# Patient Record
Sex: MALE | Race: WHITE | NOT HISPANIC OR LATINO | Employment: FULL TIME | RURAL
[De-identification: names, ages, dates, MRNs, and addresses within clinical notes are randomized per-mention and may not be internally consistent; named-entity substitution may affect disease eponyms.]

---

## 2022-07-13 ENCOUNTER — OFFICE VISIT (OUTPATIENT)
Dept: OTOLARYNGOLOGY | Facility: CLINIC | Age: 37
End: 2022-07-13
Payer: COMMERCIAL

## 2022-07-13 VITALS — BODY MASS INDEX: 26.95 KG/M2 | HEIGHT: 74 IN | WEIGHT: 210 LBS

## 2022-07-13 DIAGNOSIS — H65.23 CHRONIC SEROUS OTITIS MEDIA OF BOTH EARS: Primary | ICD-10-CM

## 2022-07-13 DIAGNOSIS — H90.2 CONDUCTIVE HEARING LOSS, UNSPECIFIED LATERALITY: ICD-10-CM

## 2022-07-13 DIAGNOSIS — H65.22 LEFT CHRONIC SEROUS OTITIS MEDIA: ICD-10-CM

## 2022-07-13 PROCEDURE — 1160F RVW MEDS BY RX/DR IN RCRD: CPT | Mod: ,,, | Performed by: OTOLARYNGOLOGY

## 2022-07-13 PROCEDURE — 3008F PR BODY MASS INDEX (BMI) DOCUMENTED: ICD-10-PCS | Mod: ,,, | Performed by: OTOLARYNGOLOGY

## 2022-07-13 PROCEDURE — 99204 OFFICE O/P NEW MOD 45 MIN: CPT | Mod: S$PBB,,, | Performed by: OTOLARYNGOLOGY

## 2022-07-13 PROCEDURE — 1159F MED LIST DOCD IN RCRD: CPT | Mod: ,,, | Performed by: OTOLARYNGOLOGY

## 2022-07-13 PROCEDURE — 99203 OFFICE O/P NEW LOW 30 MIN: CPT | Mod: PBBFAC | Performed by: OTOLARYNGOLOGY

## 2022-07-13 PROCEDURE — 3008F BODY MASS INDEX DOCD: CPT | Mod: ,,, | Performed by: OTOLARYNGOLOGY

## 2022-07-13 PROCEDURE — 99204 PR OFFICE/OUTPT VISIT, NEW, LEVL IV, 45-59 MIN: ICD-10-PCS | Mod: S$PBB,,, | Performed by: OTOLARYNGOLOGY

## 2022-07-13 PROCEDURE — 1160F PR REVIEW ALL MEDS BY PRESCRIBER/CLIN PHARMACIST DOCUMENTED: ICD-10-PCS | Mod: ,,, | Performed by: OTOLARYNGOLOGY

## 2022-07-13 PROCEDURE — 1159F PR MEDICATION LIST DOCUMENTED IN MEDICAL RECORD: ICD-10-PCS | Mod: ,,, | Performed by: OTOLARYNGOLOGY

## 2022-07-13 NOTE — PROGRESS NOTES
Subjective:       Patient ID: Miky Lopez is a 37 y.o. male.    Chief Complaint: Follow-up (Pt states he has a left PE tube, not sure how long PE tube has been in ear. States right ear has no hearing. )  irght ear has chronic surgery   HPI  Review of Systems   HENT: Positive for ear pain and hearing loss.    All other systems reviewed and are negative.      Objective:      Physical Exam  General: NAD  Head: Normocephalic, atraumatic, no facial asymmetry/normal strength,  Ears: Both auricules normal in appearance, w/o deformities tympanic membranes right scarred left tube in canal non functioning  external auditory canals normal  Nose: External nose w/o deformities normal turbinates no drainage or inflammation  Oral Cavity: Lips, gums, floor of mouth, tongue hard palate, and buccal mucosa without mass/lesion  Oropharynx: Mucosa pink and moist, soft palate, posterior pharynx and oropharyngeal wall without mass/lesion  Neck: Supple, symmetric, trachea midline, no palpable mass/lesion, no palpable cervical lymphadenopathy  Skin: Warm and dry, no concerning lesions  Respiratory: Respirations even, unlabored    Assessment:       1. Chronic serous otitis media of both ears    2. Left chronic serous otitis media    3. Conductive hearing loss, unspecified laterality        Plan:       T-tube in OR on left

## 2022-07-29 ENCOUNTER — ANESTHESIA EVENT (OUTPATIENT)
Dept: SURGERY | Facility: HOSPITAL | Age: 37
End: 2022-07-29
Payer: COMMERCIAL

## 2022-07-29 ENCOUNTER — ANESTHESIA (OUTPATIENT)
Dept: SURGERY | Facility: HOSPITAL | Age: 37
End: 2022-07-29
Payer: COMMERCIAL

## 2022-07-29 ENCOUNTER — HOSPITAL ENCOUNTER (OUTPATIENT)
Facility: HOSPITAL | Age: 37
Discharge: HOME | End: 2022-07-29
Attending: OTOLARYNGOLOGY | Admitting: OTOLARYNGOLOGY
Payer: COMMERCIAL

## 2022-07-29 VITALS
HEIGHT: 71 IN | DIASTOLIC BLOOD PRESSURE: 96 MMHG | RESPIRATION RATE: 16 BRPM | HEART RATE: 77 BPM | SYSTOLIC BLOOD PRESSURE: 140 MMHG | OXYGEN SATURATION: 99 % | BODY MASS INDEX: 29.12 KG/M2 | WEIGHT: 208 LBS | TEMPERATURE: 98 F

## 2022-07-29 DIAGNOSIS — H66.92 CHRONIC OTITIS MEDIA OF LEFT EAR: Primary | ICD-10-CM

## 2022-07-29 PROCEDURE — D9220A PRA ANESTHESIA: ICD-10-PCS | Mod: CRNA,,, | Performed by: NURSE ANESTHETIST, CERTIFIED REGISTERED

## 2022-07-29 PROCEDURE — 69436 PR CREATE EARDRUM OPENING,GEN ANESTH: ICD-10-PCS | Mod: LT,,, | Performed by: OTOLARYNGOLOGY

## 2022-07-29 PROCEDURE — D9220A PRA ANESTHESIA: Mod: CRNA,,, | Performed by: NURSE ANESTHETIST, CERTIFIED REGISTERED

## 2022-07-29 PROCEDURE — 27201423 OPTIME MED/SURG SUP & DEVICES STERILE SUPPLY: Performed by: OTOLARYNGOLOGY

## 2022-07-29 PROCEDURE — 71000015 HC POSTOP RECOV 1ST HR: Performed by: OTOLARYNGOLOGY

## 2022-07-29 PROCEDURE — 69436 CREATE EARDRUM OPENING: CPT | Mod: LT,,, | Performed by: OTOLARYNGOLOGY

## 2022-07-29 PROCEDURE — D9220A PRA ANESTHESIA: ICD-10-PCS | Mod: ANES,,, | Performed by: ANESTHESIOLOGY

## 2022-07-29 PROCEDURE — 37000008 HC ANESTHESIA 1ST 15 MINUTES: Performed by: OTOLARYNGOLOGY

## 2022-07-29 PROCEDURE — 36000704 HC OR TIME LEV I 1ST 15 MIN: Performed by: OTOLARYNGOLOGY

## 2022-07-29 PROCEDURE — D9220A PRA ANESTHESIA: Mod: ANES,,, | Performed by: ANESTHESIOLOGY

## 2022-07-29 PROCEDURE — 63600175 PHARM REV CODE 636 W HCPCS: Performed by: NURSE ANESTHETIST, CERTIFIED REGISTERED

## 2022-07-29 PROCEDURE — 25000003 PHARM REV CODE 250: Performed by: OTOLARYNGOLOGY

## 2022-07-29 PROCEDURE — 71000033 HC RECOVERY, INTIAL HOUR: Performed by: OTOLARYNGOLOGY

## 2022-07-29 RX ORDER — PROPOFOL 10 MG/ML
VIAL (ML) INTRAVENOUS
Status: DISCONTINUED | OUTPATIENT
Start: 2022-07-29 | End: 2022-07-29

## 2022-07-29 RX ORDER — MORPHINE SULFATE 8 MG/ML
4 INJECTION INTRAMUSCULAR; INTRAVENOUS; SUBCUTANEOUS EVERY 5 MIN PRN
Status: DISCONTINUED | OUTPATIENT
Start: 2022-07-29 | End: 2022-07-29 | Stop reason: HOSPADM

## 2022-07-29 RX ORDER — MEPERIDINE HYDROCHLORIDE 25 MG/ML
25 INJECTION INTRAMUSCULAR; INTRAVENOUS; SUBCUTANEOUS EVERY 10 MIN PRN
Status: DISCONTINUED | OUTPATIENT
Start: 2022-07-29 | End: 2022-07-29 | Stop reason: HOSPADM

## 2022-07-29 RX ORDER — ONDANSETRON 2 MG/ML
4 INJECTION INTRAMUSCULAR; INTRAVENOUS DAILY PRN
Status: DISCONTINUED | OUTPATIENT
Start: 2022-07-29 | End: 2022-07-29 | Stop reason: HOSPADM

## 2022-07-29 RX ORDER — HYDROMORPHONE HYDROCHLORIDE 2 MG/ML
0.5 INJECTION, SOLUTION INTRAMUSCULAR; INTRAVENOUS; SUBCUTANEOUS EVERY 5 MIN PRN
Status: DISCONTINUED | OUTPATIENT
Start: 2022-07-29 | End: 2022-07-29 | Stop reason: HOSPADM

## 2022-07-29 RX ORDER — CIPROFLOXACIN AND DEXAMETHASONE 3; 1 MG/ML; MG/ML
SUSPENSION/ DROPS AURICULAR (OTIC)
Status: DISCONTINUED | OUTPATIENT
Start: 2022-07-29 | End: 2022-07-29 | Stop reason: HOSPADM

## 2022-07-29 RX ORDER — OXYCODONE HYDROCHLORIDE 5 MG/1
5 TABLET ORAL
Status: DISCONTINUED | OUTPATIENT
Start: 2022-07-29 | End: 2022-07-29 | Stop reason: HOSPADM

## 2022-07-29 RX ORDER — DIPHENHYDRAMINE HYDROCHLORIDE 50 MG/ML
25 INJECTION INTRAMUSCULAR; INTRAVENOUS EVERY 6 HOURS PRN
Status: DISCONTINUED | OUTPATIENT
Start: 2022-07-29 | End: 2022-07-29 | Stop reason: HOSPADM

## 2022-07-29 RX ORDER — MIDAZOLAM HYDROCHLORIDE 1 MG/ML
INJECTION INTRAMUSCULAR; INTRAVENOUS
Status: DISCONTINUED | OUTPATIENT
Start: 2022-07-29 | End: 2022-07-29

## 2022-07-29 RX ADMIN — PROPOFOL 200 MG: 10 INJECTION, EMULSION INTRAVENOUS at 08:07

## 2022-07-29 RX ADMIN — MIDAZOLAM 2 MG: 1 INJECTION INTRAMUSCULAR; INTRAVENOUS at 08:07

## 2022-07-29 NOTE — OR NURSING
0822 PT TO PACU ASLEEP. ORAL AIRWAY IN PLACE. O2 AT 5L VIA FACEMASK. IV INFUSING TO L HAND. COTTON BALL TO L EAR C/D/I. NO DRAINAGE NOTED. VSS. SAFETY MEASURES IN PLACE.    0842 PT AWAKE, FREQUENTLY DROWSY. ORAL AIRWAY REMOVED. VSS. DRESSING TO L EAR C/D/I. FACEMASK REMOVED. SAO2 97% ON RA.    0855 OUT OF PACU    0900 PT TRANSFERRED TO [facility] 6. RELEASED TO BALBINA WHYTE RN. PT AWAKE AND ALERT. RESP EVEN AND UNLABORED. IV INFUSING. COTTON BALL TO L EAR C/D/I. VS: /100, HR 66, RR 16, SAO2 96% ON RA. PT STATES HIS BP IS NORMALLY HIGH BUT HE DOES NOT TAKE MEDICATION FOR IT. SAFETY MEASURES IN PLACES.

## 2022-07-29 NOTE — ANESTHESIA POSTPROCEDURE EVALUATION
Anesthesia Post Evaluation    Patient: Miky Lopez    Procedure(s) Performed: Procedure(s) (LRB):  MYRINGOTOMY, WITH TYMPANOSTOMY TUBE INSERTION (LEFT T-TUBE) (Left)    Final Anesthesia Type: general      Patient location during evaluation: PACU  Patient participation: Yes- Able to Participate  Level of consciousness: awake and alert  Post-procedure vital signs: reviewed and stable  Pain management: adequate  Airway patency: patent  EDWIN mitigation strategies: Multimodal analgesia  PONV status at discharge: No PONV  Anesthetic complications: no      Cardiovascular status: blood pressure returned to baseline  Respiratory status: unassisted  Hydration status: euvolemic  Follow-up not needed.          Vitals Value Taken Time   /96 07/29/22 0915   Temp 36.4 °C (97.6 °F) 07/29/22 0825   Pulse 77 07/29/22 0930   Resp 16 07/29/22 0930   SpO2 99 % 07/29/22 0930         Event Time   Out of Recovery 08:55:00         Pain/Justo Score: Justo Score: 9 (7/29/2022  8:51 AM)

## 2022-07-29 NOTE — TRANSFER OF CARE
"Anesthesia Transfer of Care Note    Patient: Miky Lopez    Procedure(s) Performed: Procedure(s) (LRB):  MYRINGOTOMY, WITH TYMPANOSTOMY TUBE INSERTION (LEFT T-TUBE) (Left)    Patient location: PACU    Anesthesia Type: general    Transport from OR: Transported from OR on room air with adequate spontaneous ventilation    Post pain: adequate analgesia    Post assessment: no apparent anesthetic complications    Post vital signs: stable    Level of consciousness: sedated and awake    Nausea/Vomiting: no nausea/vomiting    Complications: none    Transfer of care protocol was followed      Last vitals:   Visit Vitals  BP (!) 135/94   Pulse 75   Temp 36.4 °C (97.6 °F)   Resp 16   Ht 5' 11" (1.803 m)   Wt 94.3 kg (208 lb)   SpO2 98%   BMI 29.01 kg/m²     "

## 2022-07-29 NOTE — ANESTHESIA PREPROCEDURE EVALUATION
07/29/2022  Miky Lopez is a 37 y.o., male.      Pre-op Assessment    I have reviewed the Patient Summary Reports.    I have reviewed the NPO Status.   I have reviewed the Medications.     Review of Systems  Social:  Non-Smoker, No Alcohol Use    Hematology/Oncology:  Hematology Normal   Oncology Normal     EENT/Dental:EENT/Dental Normal   Cardiovascular:  Cardiovascular Normal     Pulmonary:  Pulmonary Normal    Renal/:  Renal/ Normal     Hepatic/GI:  Hepatic/GI Normal    Musculoskeletal:  Musculoskeletal Normal    Neurological:  Neurology Normal    Endocrine:  Endocrine Normal    Dermatological:  Skin Normal    Psych:  Psychiatric Normal           Physical Exam  General: Well nourished, Cooperative, Alert and Oriented    Airway:  Mallampati: II / II  Mouth Opening: Normal  TM Distance: Normal  Neck ROM: Normal ROM    Dental:  Intact    Chest/Lungs:  Clear to auscultation    Heart:  Rate: Normal  Rhythm: Regular Rhythm  Sounds: Normal        Anesthesia Plan  Type of Anesthesia, risks & benefits discussed:    Anesthesia Type: Gen ETT  Intra-op Monitoring Plan: Standard ASA Monitors  Post Op Pain Control Plan: multimodal analgesia  Induction:  IV  Airway Plan: Direct  Informed Consent: Informed consent signed with the Patient and all parties understand the risks and agree with anesthesia plan.  All questions answered.   ASA Score: 1  Day of Surgery Review of History & Physical: H&P Update referred to the surgeon/provider.I have interviewed and examined the patient. I have reviewed the patient's H&P dated: There are no significant changes.     Ready For Surgery From Anesthesia Perspective.     .

## 2022-08-04 ENCOUNTER — OFFICE VISIT (OUTPATIENT)
Dept: OTOLARYNGOLOGY | Facility: CLINIC | Age: 37
End: 2022-08-04
Payer: COMMERCIAL

## 2022-08-04 VITALS — HEIGHT: 71 IN | WEIGHT: 208 LBS | BODY MASS INDEX: 29.12 KG/M2

## 2022-08-04 DIAGNOSIS — H65.23 CHRONIC SEROUS OTITIS MEDIA OF BOTH EARS: Primary | ICD-10-CM

## 2022-08-04 PROCEDURE — 99213 OFFICE O/P EST LOW 20 MIN: CPT | Mod: PBBFAC | Performed by: OTOLARYNGOLOGY

## 2022-08-04 PROCEDURE — 3008F PR BODY MASS INDEX (BMI) DOCUMENTED: ICD-10-PCS | Mod: ,,, | Performed by: OTOLARYNGOLOGY

## 2022-08-04 PROCEDURE — 99024 POSTOP FOLLOW-UP VISIT: CPT | Mod: ,,, | Performed by: OTOLARYNGOLOGY

## 2022-08-04 PROCEDURE — 1160F RVW MEDS BY RX/DR IN RCRD: CPT | Mod: ,,, | Performed by: OTOLARYNGOLOGY

## 2022-08-04 PROCEDURE — 99024 PR POST-OP FOLLOW-UP VISIT: ICD-10-PCS | Mod: ,,, | Performed by: OTOLARYNGOLOGY

## 2022-08-04 PROCEDURE — 1159F PR MEDICATION LIST DOCUMENTED IN MEDICAL RECORD: ICD-10-PCS | Mod: ,,, | Performed by: OTOLARYNGOLOGY

## 2022-08-04 PROCEDURE — 1159F MED LIST DOCD IN RCRD: CPT | Mod: ,,, | Performed by: OTOLARYNGOLOGY

## 2022-08-04 PROCEDURE — 1160F PR REVIEW ALL MEDS BY PRESCRIBER/CLIN PHARMACIST DOCUMENTED: ICD-10-PCS | Mod: ,,, | Performed by: OTOLARYNGOLOGY

## 2022-08-04 PROCEDURE — 3008F BODY MASS INDEX DOCD: CPT | Mod: ,,, | Performed by: OTOLARYNGOLOGY

## 2022-08-04 NOTE — PROGRESS NOTES
Subjective:       Patient ID: Miky Lopez is a 37 y.o. male.    Chief Complaint: Follow-up (10 day post-op left PE tube, pt states left ear is draining at times.)    HPI  Review of Systems    Objective:      Physical Exam  tube open in place  Assessment:       1. Chronic serous otitis media of both ears        Plan:       F/u 4 months

## 2022-08-15 ENCOUNTER — OFFICE VISIT (OUTPATIENT)
Dept: FAMILY MEDICINE | Facility: CLINIC | Age: 37
End: 2022-08-15
Payer: COMMERCIAL

## 2022-08-15 VITALS
TEMPERATURE: 99 F | BODY MASS INDEX: 29.01 KG/M2 | OXYGEN SATURATION: 95 % | SYSTOLIC BLOOD PRESSURE: 130 MMHG | DIASTOLIC BLOOD PRESSURE: 70 MMHG | RESPIRATION RATE: 20 BRPM | HEART RATE: 101 BPM | HEIGHT: 71 IN

## 2022-08-15 DIAGNOSIS — R05.9 COUGHING: ICD-10-CM

## 2022-08-15 DIAGNOSIS — J02.9 SORETHROAT: Primary | ICD-10-CM

## 2022-08-15 LAB
CTP QC/QA: YES
SARS-COV-2 AG RESP QL IA.RAPID: NEGATIVE

## 2022-08-15 PROCEDURE — 99213 OFFICE O/P EST LOW 20 MIN: CPT | Mod: ,,, | Performed by: NURSE PRACTITIONER

## 2022-08-15 PROCEDURE — 1159F PR MEDICATION LIST DOCUMENTED IN MEDICAL RECORD: ICD-10-PCS | Mod: ,,, | Performed by: NURSE PRACTITIONER

## 2022-08-15 PROCEDURE — 3075F PR MOST RECENT SYSTOLIC BLOOD PRESS GE 130-139MM HG: ICD-10-PCS | Mod: ,,, | Performed by: NURSE PRACTITIONER

## 2022-08-15 PROCEDURE — 3078F DIAST BP <80 MM HG: CPT | Mod: ,,, | Performed by: NURSE PRACTITIONER

## 2022-08-15 PROCEDURE — 3008F BODY MASS INDEX DOCD: CPT | Mod: ,,, | Performed by: NURSE PRACTITIONER

## 2022-08-15 PROCEDURE — 1159F MED LIST DOCD IN RCRD: CPT | Mod: ,,, | Performed by: NURSE PRACTITIONER

## 2022-08-15 PROCEDURE — 3078F PR MOST RECENT DIASTOLIC BLOOD PRESSURE < 80 MM HG: ICD-10-PCS | Mod: ,,, | Performed by: NURSE PRACTITIONER

## 2022-08-15 PROCEDURE — 3075F SYST BP GE 130 - 139MM HG: CPT | Mod: ,,, | Performed by: NURSE PRACTITIONER

## 2022-08-15 PROCEDURE — 87426 SARS CORONAVIRUS 2 ANTIGEN POCT: ICD-10-PCS | Mod: QW,,, | Performed by: NURSE PRACTITIONER

## 2022-08-15 PROCEDURE — 87426 SARSCOV CORONAVIRUS AG IA: CPT | Mod: QW,,, | Performed by: NURSE PRACTITIONER

## 2022-08-15 PROCEDURE — 3008F PR BODY MASS INDEX (BMI) DOCUMENTED: ICD-10-PCS | Mod: ,,, | Performed by: NURSE PRACTITIONER

## 2022-08-15 PROCEDURE — 99213 PR OFFICE/OUTPT VISIT, EST, LEVL III, 20-29 MIN: ICD-10-PCS | Mod: ,,, | Performed by: NURSE PRACTITIONER

## 2022-08-15 RX ORDER — AMOXICILLIN 875 MG/1
875 TABLET, FILM COATED ORAL EVERY 12 HOURS
Qty: 14 TABLET | Refills: 0 | Status: SHIPPED | OUTPATIENT
Start: 2022-08-15

## 2022-08-15 NOTE — PROGRESS NOTES
MERRITT Corona   [facility]  [address]   [place] [address]     PATIENT NAME: Miky Lopez  : 1985  DATE: 8/15/22  MRN: [id number]      Billing Provider: MERRITT Corona  Level of Service:   Patient PCP Information     Provider PCP Type    Primary Doctor No General          Reason for Visit / Chief Complaint: Sore Throat (Sore throat with fever and coughing), Cough, and Fever       Update PCP  Update Chief Complaint         History of Present Illness / Problem Focused Workflow     Miky Lopez is a 37 y.o. male presents to the clinic  With  Onset yesterday with sore throat, painful to swallow, body aches and is worse this am.  No cough or congestion and no sick contacts.  Visit conducted in patients car due to covid with limited exam        Review of Systems     Review of Systems   HENT: Positive for sore throat.    Musculoskeletal: Positive for myalgias.        Medical / Social / Family History   No past medical history on file.    Past Surgical History:   Procedure Laterality Date    MYRINGOTOMY W/ TUBES      MYRINGOTOMY WITH INSERTION OF VENTILATION TUBE Left 2022    Procedure: MYRINGOTOMY, WITH TYMPANOSTOMY TUBE INSERTION (LEFT T-TUBE);  Surgeon: Hernando Mai MD;  Location: [facility];  Service: ENT;  Laterality: Left;       Social History    reports that he has been smoking. He has never used smokeless tobacco. He reports current alcohol use. He reports that he does not use drugs.    Family History  's family history is not on file.    Medications and Allergies     Medications  No outpatient medications have been marked as taking for the 8/15/22 encounter (Office Visit) with MERRITT Corona.       Allergies  Review of patient's allergies indicates:  No Known Allergies    Physical Examination     Vitals:    08/15/22 0744   BP: 130/70   BP Location: Left arm   Patient Position: Sitting   BP Method: Large (Manual)   Pulse: 101   Resp: 20   Temp: 99.3 °F (37.4 °C)  "  TempSrc: Oral   SpO2: 95%   Height: 5' 11" (1.803 m)      Physical Exam  Constitutional:       Appearance: Normal appearance.   HENT:      Mouth/Throat:      Pharynx: Oropharyngeal exudate (small amount right tonsil) and posterior oropharyngeal erythema present.   Cardiovascular:      Rate and Rhythm: Normal rate and regular rhythm.      Heart sounds: Normal heart sounds.   Pulmonary:      Effort: Pulmonary effort is normal.      Breath sounds: Normal breath sounds.   Lymphadenopathy:      Cervical: No cervical adenopathy.   Skin:     General: Skin is warm and dry.   Neurological:      Mental Status: He is alert and oriented to person, place, and time.          Assessment and Plan (including Health Maintenance)      Problem List  Smart Sets  Document Outside HM   :    Plan:  Rapid covid test negative. Will treat with Amoxicillin and cautioned him that he may still have covid and too early to  Convert test. Recommended he wear mask around others to prevent spread of germs.  Follow up if symptoms worsen.        Health Maintenance Due   Topic Date Due    Hepatitis C Screening  Never done    Lipid Panel  Never done    COVID-19 Vaccine (1) Never done    Pneumococcal Vaccines (Age 0-64) (1 - PCV) Never done    HIV Screening  Never done    TETANUS VACCINE  Never done       Problem List Items Addressed This Visit        ENT    Sorethroat - Primary       Pulmonary    Coughing    Relevant Orders    SARS Coronavirus 2 Antigen, POCT (Completed)        Sorethroat    Coughing  -     SARS Coronavirus 2 Antigen, POCT    Other orders  -     amoxicillin (AMOXIL) 875 MG tablet; Take 1 tablet (875 mg total) by mouth every 12 (twelve) hours.  Dispense: 14 tablet; Refill: 0       Health Maintenance Topics with due status: Not Due       Topic Last Completion Date    Influenza Vaccine 09/01/2021       Procedures     No future appointments.     Follow up if symptoms worsen or fail to improve.     Signature:  MERRITT Corona    Date " of encounter: 8/15/22

## 2022-08-15 NOTE — PATIENT INSTRUCTIONS
Rapid covid test negative. Will treat with Amoxicillin and cautioned him that he may still have covid and too early to  Convert test. Recommended he wear mask around others to prevent spread of germs.  Follow up if symptoms worsen.

## 2024-06-05 ENCOUNTER — OFFICE VISIT (OUTPATIENT)
Dept: FAMILY MEDICINE | Facility: CLINIC | Age: 39
End: 2024-06-05
Payer: COMMERCIAL

## 2024-06-05 VITALS
RESPIRATION RATE: 20 BRPM | OXYGEN SATURATION: 97 % | DIASTOLIC BLOOD PRESSURE: 88 MMHG | WEIGHT: 208.63 LBS | HEART RATE: 77 BPM | BODY MASS INDEX: 29.21 KG/M2 | HEIGHT: 71 IN | SYSTOLIC BLOOD PRESSURE: 144 MMHG | TEMPERATURE: 98 F

## 2024-06-05 DIAGNOSIS — J06.9 UPPER RESPIRATORY TRACT INFECTION, UNSPECIFIED TYPE: Primary | ICD-10-CM

## 2024-06-05 PROCEDURE — 3079F DIAST BP 80-89 MM HG: CPT | Mod: ,,, | Performed by: NURSE PRACTITIONER

## 2024-06-05 PROCEDURE — 3008F BODY MASS INDEX DOCD: CPT | Mod: ,,, | Performed by: NURSE PRACTITIONER

## 2024-06-05 PROCEDURE — 99213 OFFICE O/P EST LOW 20 MIN: CPT | Mod: ,,, | Performed by: NURSE PRACTITIONER

## 2024-06-05 PROCEDURE — 3077F SYST BP >= 140 MM HG: CPT | Mod: ,,, | Performed by: NURSE PRACTITIONER

## 2024-06-05 PROCEDURE — 1159F MED LIST DOCD IN RCRD: CPT | Mod: ,,, | Performed by: NURSE PRACTITIONER

## 2024-06-05 RX ORDER — CEFTRIAXONE 1 G/1
1 INJECTION, POWDER, FOR SOLUTION INTRAMUSCULAR; INTRAVENOUS
Status: COMPLETED | OUTPATIENT
Start: 2024-06-05 | End: 2024-06-06

## 2024-06-05 RX ORDER — AMOXICILLIN AND CLAVULANATE POTASSIUM 875; 125 MG/1; MG/1
1 TABLET, FILM COATED ORAL EVERY 12 HOURS
Qty: 20 TABLET | Refills: 0 | Status: SHIPPED | OUTPATIENT
Start: 2024-06-05

## 2024-06-05 NOTE — PROGRESS NOTES
MERRITT Corona   [facility]  [address]   [place] [address]     PATIENT NAME: Miky Lopez  : 1985  DATE: 24  MRN: [id number]      Billing Provider: MERRITT Corona  Level of Service:   Patient PCP Information       Provider PCP Type    MERRITT Corona General            Reason for Visit / Chief Complaint: Sinus Problem (HA, congestion, runny nose, cough x 2 weeks, OTC meds not helping)         History of Present Illness / Problem Focused Workflow     Miky Lopez is a 39 y.o. male presents to the clinic  with approx 2 week history of sinus congestion, headaches, cough with little production of  green phlegm.  He  has been taking Mucinex, Dayquil/Nyquil and just not improving      Review of Systems     Review of Systems   Constitutional:  Positive for fever. Negative for fatigue.   HENT:  Positive for nasal congestion and sore throat.    Respiratory:  Positive for wheezing. Negative for cough, chest tightness and shortness of breath.    Cardiovascular:  Negative for chest pain, palpitations and leg swelling.   Gastrointestinal:  Negative for nausea, vomiting and reflux.   Neurological:  Negative for weakness and memory loss.   Psychiatric/Behavioral:  Negative for confusion and sleep disturbance.         Medical / Social / Family History     Past Medical History:   Diagnosis Date    Cholesteatoma     right ear       Past Surgical History:   Procedure Laterality Date    MYRINGOTOMY W/ TUBES      MYRINGOTOMY WITH INSERTION OF VENTILATION TUBE Left 2022    Procedure: MYRINGOTOMY, WITH TYMPANOSTOMY TUBE INSERTION (LEFT T-TUBE);  Surgeon: Hernando Mai MD;  Location: [facility];  Service: ENT;  Laterality: Left;       Social History    reports that he has been smoking cigarettes. He started smoking about 29 years ago. He has a 14.7 pack-year smoking history. He has been exposed to tobacco smoke. He has never used smokeless tobacco. He reports current alcohol use. He reports  "that he does not use drugs.    Family History  's family history includes Cancer in his father; Hypertension in his father and mother.    Medications and Allergies     Medications  No outpatient medications have been marked as taking for the 6/5/24 encounter (Office Visit) with Chrissy Casanova FNP.       Allergies  Review of patient's allergies indicates:  No Known Allergies    Physical Examination     Vitals:    06/05/24 1611   BP: (!) 144/88   Pulse: 77   Resp: 20   Temp: 97.9 °F (36.6 °C)   TempSrc: Oral   SpO2: 97%   Weight: 94.6 kg (208 lb 9.6 oz)   Height: 5' 11" (1.803 m)      Physical Exam  Constitutional:       Appearance: Normal appearance.   HENT:      Nose: Congestion present.      Mouth/Throat:      Pharynx: Posterior oropharyngeal erythema present. No oropharyngeal exudate.   Cardiovascular:      Rate and Rhythm: Normal rate and regular rhythm.      Pulses: Normal pulses.      Heart sounds: Normal heart sounds.   Pulmonary:      Effort: Pulmonary effort is normal.      Breath sounds: Normal breath sounds.   Lymphadenopathy:      Cervical: Cervical adenopathy (shotty) present.   Skin:     General: Skin is warm and dry.   Neurological:      Mental Status: He is alert and oriented to person, place, and time.          Assessment and Plan (including Health Maintenance)     :    Plan:  will treat  URI with   augmentin 875mg bid,  continue  guaifinesin/mucinex, drink lots of fluids,  continue flonase.    Will rx  ed a hist one tab bid.  Rocephin Gm 1          Health Maintenance Due   Topic Date Due    Hepatitis C Screening  Never done    Lipid Panel  Never done    Pneumococcal Vaccines (Age 0-64) (1 of 2 - PCV) Never done    HIV Screening  Never done    Hemoglobin A1c (Diabetic Prevention Screening)  Never done    COVID-19 Vaccine (1 - 2023-24 season) Never done       Problem List Items Addressed This Visit    None  .  There are no diagnoses linked to this encounter.   Health Maintenance Topics with due " status: Not Due       Topic Last Completion Date    Influenza Vaccine 09/01/2021    TETANUS VACCINE 11/27/2023       Procedures     No future appointments.     No follow-ups on file.     Signature:  MERRITT Corona    Date of encounter: 6/5/24

## 2024-06-06 PROCEDURE — 96372 THER/PROPH/DIAG INJ SC/IM: CPT | Mod: ,,, | Performed by: NURSE PRACTITIONER

## 2024-06-06 RX ADMIN — CEFTRIAXONE 1 G: 1 INJECTION, POWDER, FOR SOLUTION INTRAMUSCULAR; INTRAVENOUS at 10:06

## 2025-06-23 ENCOUNTER — OFFICE VISIT (OUTPATIENT)
Dept: FAMILY MEDICINE | Facility: CLINIC | Age: 40
End: 2025-06-23
Payer: COMMERCIAL

## 2025-06-23 VITALS
SYSTOLIC BLOOD PRESSURE: 124 MMHG | HEART RATE: 75 BPM | HEIGHT: 71 IN | DIASTOLIC BLOOD PRESSURE: 90 MMHG | TEMPERATURE: 98 F | RESPIRATION RATE: 18 BRPM | OXYGEN SATURATION: 98 % | BODY MASS INDEX: 29.43 KG/M2 | WEIGHT: 210.19 LBS

## 2025-06-23 DIAGNOSIS — J06.9 BACTERIAL UPPER RESPIRATORY INFECTION: Primary | ICD-10-CM

## 2025-06-23 DIAGNOSIS — B96.89 BACTERIAL UPPER RESPIRATORY INFECTION: Primary | ICD-10-CM

## 2025-06-23 PROCEDURE — 99213 OFFICE O/P EST LOW 20 MIN: CPT | Mod: 25,,,

## 2025-06-23 PROCEDURE — 3080F DIAST BP >= 90 MM HG: CPT | Mod: ,,,

## 2025-06-23 PROCEDURE — 3074F SYST BP LT 130 MM HG: CPT | Mod: ,,,

## 2025-06-23 PROCEDURE — 1159F MED LIST DOCD IN RCRD: CPT | Mod: ,,,

## 2025-06-23 PROCEDURE — 1160F RVW MEDS BY RX/DR IN RCRD: CPT | Mod: ,,,

## 2025-06-23 PROCEDURE — 96372 THER/PROPH/DIAG INJ SC/IM: CPT | Mod: ,,,

## 2025-06-23 PROCEDURE — 3008F BODY MASS INDEX DOCD: CPT | Mod: ,,,

## 2025-06-23 RX ORDER — AMOXICILLIN 500 MG/1
500 TABLET, FILM COATED ORAL 3 TIMES DAILY
Qty: 21 TABLET | Refills: 0 | Status: CANCELLED | OUTPATIENT
Start: 2025-06-23 | End: 2025-06-30

## 2025-06-23 RX ORDER — AMOXICILLIN 875 MG/1
875 TABLET, COATED ORAL EVERY 12 HOURS
Qty: 14 TABLET | Refills: 0 | Status: SHIPPED | OUTPATIENT
Start: 2025-06-23 | End: 2025-06-30

## 2025-06-23 RX ORDER — DEXAMETHASONE SODIUM PHOSPHATE 4 MG/ML
4 INJECTION, SOLUTION INTRA-ARTICULAR; INTRALESIONAL; INTRAMUSCULAR; INTRAVENOUS; SOFT TISSUE
Status: COMPLETED | OUTPATIENT
Start: 2025-06-23 | End: 2025-06-23

## 2025-06-23 RX ORDER — CEFTRIAXONE 1 G/1
1 INJECTION, POWDER, FOR SOLUTION INTRAMUSCULAR; INTRAVENOUS
Status: COMPLETED | OUTPATIENT
Start: 2025-06-23 | End: 2025-06-23

## 2025-06-23 RX ADMIN — CEFTRIAXONE 1 G: 1 INJECTION, POWDER, FOR SOLUTION INTRAMUSCULAR; INTRAVENOUS at 11:06

## 2025-06-23 RX ADMIN — DEXAMETHASONE SODIUM PHOSPHATE 4 MG: 4 INJECTION, SOLUTION INTRA-ARTICULAR; INTRALESIONAL; INTRAMUSCULAR; INTRAVENOUS; SOFT TISSUE at 11:06

## 2025-06-23 NOTE — PROGRESS NOTES
ZHANNA See   [facility]  [address]   [place] [address]     PATIENT NAME: Miky Lopez  : 1985  DATE: 25  MRN: [id number]      Billing Provider: ZHANNA See  Level of Service:   Patient PCP Information       Provider PCP Type    MERRITT Corona General            Reason for Visit / Chief Complaint: Sinus Problem (Patient c/o of having head congestion . Patient states it been going on x 2 weeks. Patient tried over the counter sudafed with no relief. He think he may have a ear infection.) and Health Maintenance (Hepatitis C Screening Never done/Lipid Panel Never done/HIV Screening Never done/Pneumococcal Vaccines (Age 0-49)(1 of 2 - PCV) Never done/Hemoglobin A1c (Diabetic Prevention Screening) Never done/COVID-19 Vaccine(1 - 2024-25 season) Never done / Patient declined all care gaps at this time.)         History of Present Illness / Problem Focused Workflow     Miky Lopez is a 40 y.o. male presents to the clinic for sinus congestion and pressure for 2 weeks. He has been taking sudafed with mild improvement in symptoms. Reports frequent ear infections and is getting permanent ear tubes placed in 2 weeks.   Denies cough, fever, chills.      Review of Systems     Review of Systems   Constitutional:  Negative for chills, diaphoresis, fatigue, fever and night sweats.   HENT:  Positive for nasal congestion, ear pain and sinus pressure/congestion. Negative for sneezing, sore throat and trouble swallowing.    Eyes: Negative.    Respiratory:  Negative for cough, shortness of breath and wheezing.    Cardiovascular: Negative.         Medical / Social / Family History     Past Medical History:   Diagnosis Date    Cholesteatoma     right ear       Past Surgical History:   Procedure Laterality Date    MYRINGOTOMY W/ TUBES      MYRINGOTOMY WITH INSERTION OF VENTILATION TUBE Left 2022    Procedure: MYRINGOTOMY, WITH TYMPANOSTOMY TUBE INSERTION (LEFT T-TUBE);   "Surgeon: Hernando Mai MD;  Location: [facility];  Service: ENT;  Laterality: Left;       Social History    reports that he has been smoking cigarettes. He started smoking about 30 years ago. He has a 15.2 pack-year smoking history. He has been exposed to tobacco smoke. He has never used smokeless tobacco. He reports current alcohol use. He reports that he does not use drugs.    Family History  's family history includes Cancer in his father; Hypertension in his father and mother.    Medications and Allergies     Medications  No outpatient medications have been marked as taking for the 6/23/25 encounter (Office Visit) with Kaelyn Queen FNP-C.     Current Facility-Administered Medications for the 6/23/25 encounter (Office Visit) with Kaelyn Queen FNP-C   Medication Dose Route Frequency Provider Last Rate Last Admin    [COMPLETED] cefTRIAXone injection 1 g  1 g Intramuscular 1 time in Clinic/HOD Kaelyn Queen FNP-C   1 g at 06/23/25 1155    [COMPLETED] dexAMETHasone injection 4 mg  4 mg Intramuscular 1 time in Clinic/HOD Kaelyn Queen FNP-C   4 mg at 06/23/25 1155       Allergies  Review of patient's allergies indicates:  No Known Allergies    Physical Examination     Vitals:    06/23/25 0913   BP: (!) 124/90   BP Location: Right arm   Patient Position: Sitting   Pulse: 75   Resp: 18   Temp: 98.4 °F (36.9 °C)   TempSrc: Oral   SpO2: 98%   Weight: 95.3 kg (210 lb 3.2 oz)   Height: 5' 11" (1.803 m)      Physical Exam  Vitals and nursing note reviewed.   HENT:      Head: Normocephalic.      Nose: Nose normal.      Mouth/Throat:      Pharynx: Posterior oropharyngeal erythema present. No oropharyngeal exudate.   Eyes:      Extraocular Movements: Extraocular movements intact.      Pupils: Pupils are equal, round, and reactive to light.   Cardiovascular:      Rate and Rhythm: Normal rate and regular rhythm.      Pulses: Normal pulses.      Heart sounds: Normal heart sounds.   Pulmonary: "      Effort: Pulmonary effort is normal.      Breath sounds: Normal breath sounds.   Musculoskeletal:         General: Normal range of motion.      Cervical back: Normal range of motion.   Skin:     General: Skin is warm and dry.   Neurological:      General: No focal deficit present.      Mental Status: He is alert and oriented to person, place, and time.   Psychiatric:         Mood and Affect: Mood normal.         Behavior: Behavior normal.          Assessment and Plan (including Health Maintenance)     :    Plan: Due to duration of symptoms, will treat for viral upper respiratory infection with antibiotics. Rocephin and decadron administered today. Amoxicillin sent to pharmacy. Drink plenty of fluids. Return to clinic if new or worsening symptoms.        Health Maintenance Due   Topic Date Due    Hepatitis C Screening  Never done    Lipid Panel  Never done    HIV Screening  Never done    Pneumococcal Vaccines (Age 0-49) (1 of 2 - PCV) Never done    Hemoglobin A1c (Diabetic Prevention Screening)  Never done    COVID-19 Vaccine (1 - 2024-25 season) Never done       Problem List Items Addressed This Visit    None  Visit Diagnoses         Bacterial upper respiratory infection    -  Primary        .  Bacterial upper respiratory infection  -     cefTRIAXone injection 1 g  -     dexAMETHasone injection 4 mg  -     amoxicillin (AMOXIL) 875 MG tablet; Take 1 tablet (875 mg total) by mouth every 12 (twelve) hours. for 7 days  Dispense: 14 tablet; Refill: 0       Health Maintenance Topics with due status: Not Due       Topic Last Completion Date    TETANUS VACCINE 11/27/2023    Influenza Vaccine Not Due    RSV Vaccine (Age 60+ and Pregnant patients) Not Due       Procedures     No future appointments.       No follow-ups on file.     Signature:  ZHANNA See    Date of encounter: 6/23/25

## (undated) DEVICE — GLOVE SURGICAL PROTEXIS PI SIZE 7.5

## (undated) DEVICE — TUBING SUCTION 5MM STERILE 3/16IN X 12FT

## (undated) DEVICE — GLOVE SURGICAL PROTEXIS PI SIZE 6

## (undated) DEVICE — BALL COTTON STERILE ENT

## (undated) DEVICE — BLADE MYRINGOTOMY 7120

## (undated) DEVICE — TUBE GROMMET ARMSTRONG PCFC 6